# Patient Record
Sex: FEMALE | Race: BLACK OR AFRICAN AMERICAN | Employment: UNEMPLOYED | URBAN - METROPOLITAN AREA
[De-identification: names, ages, dates, MRNs, and addresses within clinical notes are randomized per-mention and may not be internally consistent; named-entity substitution may affect disease eponyms.]

---

## 2018-08-15 PROBLEM — R92.8 ABNORMAL ULTRASOUND OF BREAST: Status: ACTIVE | Noted: 2017-12-27

## 2018-09-12 PROBLEM — N30.10 INTERSTITIAL CYSTITIS: Status: ACTIVE | Noted: 2018-09-12

## 2018-09-18 ENCOUNTER — HOSPITAL ENCOUNTER (OUTPATIENT)
Dept: PHYSICAL THERAPY | Age: 38
Discharge: HOME | End: 2018-09-18
Attending: OBSTETRICS & GYNECOLOGY
Payer: COMMERCIAL

## 2018-09-18 DIAGNOSIS — R30.9 URINARY PAIN: ICD-10-CM

## 2018-09-18 PROCEDURE — 97110 THERAPEUTIC EXERCISES: CPT

## 2018-09-18 PROCEDURE — 97161 PT EVAL LOW COMPLEX 20 MIN: CPT

## 2018-09-18 NOTE — THERAPY EVALUATION
Byron Garcia  : 1980  [facility]  Secondary:  [address]  at [facility]  [address], [address], [place]  [phone]   [phone]        OUTPATIENT PHYSICAL THERAPY:Initial Assessment 2018    ICD-10: Treatment Diagnosis: R39.89 Bladder pain, R30.9 Urinary pain, R10.2 Pelvic and perineal pain, R27.8 Lack of coordination (muscle incoordination)  Precautions/Allergies:   Sulfa (sulfonamide antibiotics)   Fall Risk Score: 0 (? 5 = High Risk)  MD Orders: Evaluate and treat MEDICAL/REFERRING DIAGNOSIS:  Urinary pain [R30.9]   DATE OF ONSET: Chronic  REFERRING PHYSICIAN: Edward Deluna DO  RETURN PHYSICIAN APPOINTMENT: Not scheduled     INITIAL ASSESSMENT:  Ms. Paul presents sign and symptoms consistent with painful bladder and pelvic floor muscle incoordination. Due to late arrival to session, pelvic floor muscle examination was deferred until next appointment. She does demonstrate dysfunctional breathing patterns likely increasing PFM activity. Pt will continue to benefit from skilled physical therapy for further evaluation of muscular dysfunction in relation to urinary/bowel evacuation pain. PROBLEM LIST (Impacting functional limitations):  1. Increased Pain  2. Decreased [place] with Home Exercise Program INTERVENTIONS PLANNED:  1. Electrical Stimulation  2. Home Exercise Program (HEP)  3. Manual Therapy  4. Neuromuscular Re-education/Strengthening  5. Range of Motion (ROM)  6. Therapeutic Activites  7. Therapeutic Exercise/Strengthening   TREATMENT PLAN:  Effective Dates: 2018 TO 2018 (90 days). Frequency/Duration: 1 time a week for 90 Days  GOALS: (Goals have been discussed and agreed upon with patient.)  Short-Term Functional Goals: Time Frame: 6 weeks  Pt will demonstrate I with basic PFM HEP to improve awareness, coordination, and timing of PFM.   Pt with demonstrate normal voluntary relaxation of the pelvic floor muscle group to improve pelvic floor ROM. Pt will independently perform proper toilet position to improve bowel emptying and decrease pain with bowel movements. Discharge Goals: Time Frame: 12 weeks  1. Pt will verbalize understanding of role of diet on bladder health and pain symptoms. 2. Pt will demonstrate I with advanced PFM HEP, including dilator training, for improved long term management of symptoms. Rehabilitation Potential For Stated Goals: Good  Regarding Delmi Humphrey's therapy, I certify that the treatment plan above will be carried out by a therapist or under their direction. Thank you for this referral,  Paloma Pace, PT, DPT               The information in this section was collected on 9/18/2018 (except where otherwise noted). HISTORY:   History of Present Injury/Illness (Reason for Referral):  Pt reports having procedure at fertility clinic 2 years ago. After this she states that she began having pelvic and vaginal pain about 1 weeks later. This did improve. Last year she began experiencing similar pain (around the same time of the year). Most recent episode began on June 16, pain has improved \"about 90%\" since it began. States that she has \"spasms\" after urination or bowel movement that last about 10-15 minutes, in June this would have last over an hour. Has made some diet changes over the last week, including discontinuing her multivitamin which has helped. Urinary: Frequency every 2-3 hours, 0 x/night. Positive for stress urinary incontinence (DANK), urgency, incomplete emptying, dysuria- toward end of stream. Pt does not use pads for protects. Denies urge urinary incontinence (UUI), mixed urinary incontinence (INA), urgency, frequency, incomplete emptying, urinary hesitancy, dysuria, hematuria. Fluid intake: 48 oz water/day; bladder irritants include: milk/almond milk 16oz, hot tea 16oz- camomile, lemon, lemongrass  Bowel: Frequency 1x/day.  Positive for pain with bowel movement (BM). Denies pushing/straining with BM, incomplete emptying, fecal incontinence, constipation. Use of stool softeners or laxatives? No  Sexual: Denies pain with intercourse. Pelvic Organ Prolapse/Pelvic Pain: Location: urethral pain. Worse with urinary/bowel evacuation, food trigger and menstrual cycle. Relief with warm compress. Past Medical History/Comorbidities:   Ms. Paul  has a past medical history of seasonal allergies and Ovarian cyst.  Ms. Paul  has a past surgical history that includes hx wisdom teeth extraction. Social History/Living Environment:      Have you ever had any pelvic trauma (orthopedic in nature, fall, MVA, etc.)? Tailbone injury  Have you ever experienced any unwanted physical or sexual contact? No  Have you ever experienced any form of medical trauma (GYN, urological, GI, etc)? No    Prior Level of Function/Work/Activity:  Pt reports \"flares\" of similar type pain every year, around same time of year. Current Medications:       Current Outpatient Prescriptions:     Omega-3 Fatty Acids (FISH OIL) 500 mg cap, Take  by mouth., Disp: , Rfl:     methen-sod phos-meth blue-hyos (UROGESIC-BLUE) 81.6-40.8-0.12 mg tab, Take 1-3 tablets per day as needed, Disp: 60 Tab, Rfl: 2   Date Last Reviewed:  9/18/2018   Number of Personal Factors/Comorbidities that affect the Plan of Care: 0: LOW COMPLEXITY   EXAMINATION:   Observation/Orthostatic Postural Assessment:          Deferred to next appointment due to time restrictions. Palpation:          Deferred to next appointment due to time restrictions. ROM:          Deferred to next appointment due to time restrictions. Strength:          Deferred to next appointment due to time restrictions. Body Structures Involved:  1. Muscles Body Functions Affected:  1. Genitourinary  2. Neuromusculoskeletal  3. Movement Related Activities and Participation Affected:  1. Learning and Applying Knowledge  2.  General Tasks and Demands  3. Self Care   Number of elements (examined above) that affect the Plan of Care: 1-2: LOW COMPLEXITY   CLINICAL PRESENTATION:   Presentation: Stable and uncomplicated: LOW COMPLEXITY   CLINICAL DECISION MAKING:   Outcome Measure:   Pelvic Floor Impact Questionnaire (PFIQ-7)  Score (out of 300) Initial: 9/18/2018  Bladder/urinary: 19  Bowel/rectum: 0  Vagina/pelvis: 0  Total: 19 Most Recent:      Interpretation of Score: This survey asks questions concerning certain bowel, bladder, or pelvic symptoms and how much these symptoms interfere with daily activities. Each section is scored on a 0-3 scale, 3 representing the greatest disability. The scores of each section (out of 100) are added together for a total score out of 300. Score 0 1-59  120-179 180-239 240-299 300   Modifier CH CI CJ CK CL CM CN     Pain Disability Index (PDI)  Score (out of 70) Initial: 9/18/2018 2/70 Most Recent:      Interpretation of Score: This survey is a simple and rapid instrument for measuring the impact that pain has on the ability of a person to participate in essential life activities. There are 7 sections, each scored on a 0-10 scale, 10 representing the greatest disability. The scores of each section are added together for a total score out of 70. Score 0 1-13 14-27 28-41 42-55 56-69 70   Modifier CH CI CJ CK CL CM CN       Medical Necessity:   · Patient is expected to demonstrate progress in range of motion and coordination to decreased pain and improve function. · Patient demonstrates good rehab potential due to higher previous functional level. Reason for Services/Other Comments:  · Patient continues to require skilled intervention due to above mentioned deficits.    Use of outcome tool(s) and clinical judgement create a POC that gives a: Clear prediction of patient's progress: LOW COMPLEXITY            TREATMENT:   (In addition to Assessment/Re-Assessment sessions the following treatments were rendered)  Pre-treatment Symptoms/Complaints:  Urinary pain  Pain: Initial:   Pain Intensity 1: 0  Post Session:  0     THERAPEUTIC EXERCISE: (10 minutes):  Exercises per grid below to improve mobility and coordination. Required minimal verbal cues to promote proper body breathing techniques and PFM relaxation. Progressed resistance, range, repetitions and complexity of movement as indicated. Date:  9/18/2018 Date:   Date:     Activity/Exercise Parameters Parameters Parameters   PF drop 5 minutes     Diaphragmatic breathing 5 minutes             Treatment/Session Assessment:    · Response to Treatment:  Pt reports good understanding of plan of care, as well as prescribed home exercise program.  All questions were answered to pt's satisfaction. Pt was invited to call with any further questions or concerns. · Compliance with Program/Exercises: Will assess as treatment progresses. · Recommendations/Intent for next treatment session: \"Next visit will focus on PFM assessment and treatment as appropriate\".   Total Treatment Duration: Evaluation 20 minutes, treatment 10 minutes  PT Patient Time In/Time Out  Time In: 0903  Time Out: [address] Nix, PT, DPT

## 2018-09-18 NOTE — PROGRESS NOTES
Ambulatory/Rehab Services H2 Model Falls Risk Assessment    Risk Factor Pts. ·   Confusion/Disorientation/Impulsivity  []    4 ·   Symptomatic Depression  []   2 ·   Altered Elimination  []   1 ·   Dizziness/Vertigo  []   1 ·   Gender (Male)  []   1 ·   Any administered antiepileptics (anticonvulsants):  []   2 ·   Any administered benzodiazepines:  []   1 ·   Visual Impairment (specify):  []   1 ·   Portable Oxygen Use  []   1 ·   Orthostatic ? BP  []   1 ·   History of Recent Falls (within 3 mos.)  []   5     Ability to Rise from Chair (choose one) Pts. ·   Ability to rise in a single movement  [x]   0 ·   Pushes up, successful in one attempt  []   1 ·   Multiple attempts, but successful  []   3 ·   Unable to rise without assistance  []   4   Total: (5 or greater = High Risk) 0     Falls Prevention Plan:   []                Physical Limitations to Exercise (specify):   []                Mobility Assistance Device (type):   []                Exercise/Equipment Adaptation (specify):    ©2010 [facility] [address] Patent #[id number].  Federal Law prohibits the replication, distribution or use without written permission from [facility] [employer]

## 2018-09-26 ENCOUNTER — HOSPITAL ENCOUNTER (OUTPATIENT)
Dept: PHYSICAL THERAPY | Age: 38
Discharge: HOME | End: 2018-09-26
Attending: OBSTETRICS & GYNECOLOGY
Payer: COMMERCIAL

## 2018-09-26 PROCEDURE — 97140 MANUAL THERAPY 1/> REGIONS: CPT

## 2018-09-26 PROCEDURE — 97110 THERAPEUTIC EXERCISES: CPT

## 2018-09-26 NOTE — PROGRESS NOTES
Jaguar Denilson  : 1980  Primary: [facility]  Secondary:  [address]  at [facility]  [address], [address], [place] [address]  [phone]   [phone]        OUTPATIENT PHYSICAL THERAPY:Initial Assessment 2018    ICD-10: Treatment Diagnosis: R39.89 Bladder pain, R30.9 Urinary pain, R10.2 Pelvic and perineal pain, R27.8 Lack of coordination (muscle incoordination)  Precautions/Allergies:   Sulfa (sulfonamide antibiotics)   Fall Risk Score: 0 (? 5 = High Risk)  MD Orders: Evaluate and treat MEDICAL/REFERRING DIAGNOSIS:  Painful micturition, unspecified [R30.9]   DATE OF ONSET: Chronic  REFERRING PHYSICIAN: Kori Deluna DO  RETURN PHYSICIAN APPOINTMENT: Not scheduled     INITIAL ASSESSMENT:  Ms. Paul presents sign and symptoms consistent with painful bladder and pelvic floor muscle incoordination. Due to late arrival to session, pelvic floor muscle examination was deferred until next appointment. She does demonstrate dysfunctional breathing patterns likely increasing PFM activity. Pt will continue to benefit from skilled physical therapy for further evaluation of muscular dysfunction in relation to urinary/bowel evacuation pain. PROBLEM LIST (Impacting functional limitations):  1. Increased Pain  2. Decreased [place] with Home Exercise Program INTERVENTIONS PLANNED:  1. Electrical Stimulation  2. Home Exercise Program (HEP)  3. Manual Therapy  4. Neuromuscular Re-education/Strengthening  5. Range of Motion (ROM)  6. Therapeutic Activites  7. Therapeutic Exercise/Strengthening   TREATMENT PLAN:  Effective Dates: 2018 TO 2018 (90 days). Frequency/Duration: 1 time a week for 90 Days  GOALS: (Goals have been discussed and agreed upon with patient.)  Short-Term Functional Goals: Time Frame: 6 weeks  Pt will demonstrate I with basic PFM HEP to improve awareness, coordination, and timing of PFM.   Pt with demonstrate normal voluntary relaxation of the pelvic floor muscle group to improve pelvic floor ROM. Pt will independently perform proper toilet position to improve bowel emptying and decrease pain with bowel movements. Discharge Goals: Time Frame: 12 weeks  1. Pt will verbalize understanding of role of diet on bladder health and pain symptoms. 2. Pt will demonstrate I with advanced PFM HEP, including dilator training, for improved long term management of symptoms. Rehabilitation Potential For Stated Goals: Good  Regarding Delmi Humphrey's therapy, I certify that the treatment plan above will be carried out by a therapist or under their direction. Thank you for this referral,  Shawanda Zambrano, PT, DPT               The information in this section was collected on 9/18/2018 (except where otherwise noted). HISTORY:   History of Present Injury/Illness (Reason for Referral):  Pt reports having procedure at fertility clinic 2 years ago. After this she states that she began having pelvic and vaginal pain about 1 weeks later. This did improve. Last year she began experiencing similar pain (around the same time of the year). Most recent episode began on June 16, pain has improved \"about 90%\" since it began. States that she has \"spasms\" after urination or bowel movement that last about 10-15 minutes, in June this would have last over an hour. Has made some diet changes over the last week, including discontinuing her multivitamin which has helped. Urinary: Frequency every 2-3 hours, 0 x/night. Positive for stress urinary incontinence (DANK), urgency, incomplete emptying, dysuria- toward end of stream. Pt does not use pads for protects. Denies urge urinary incontinence (UUI), mixed urinary incontinence (INA), urgency, frequency, incomplete emptying, urinary hesitancy, dysuria, hematuria. Fluid intake: 48 oz water/day; bladder irritants include: milk/almond milk 16oz, hot tea 16oz- camomile, lemon, lemongrass  Bowel: Frequency 1x/day. Positive for pain with bowel movement (BM). Denies pushing/straining with BM, incomplete emptying, fecal incontinence, constipation. Use of stool softeners or laxatives? No  Sexual: Denies pain with intercourse. Pelvic Organ Prolapse/Pelvic Pain: Location: urethral pain. Worse with urinary/bowel evacuation, food trigger and menstrual cycle. Relief with warm compress. Past Medical History/Comorbidities:   Ms. Delia Harada  has a past medical history of seasonal allergies and Ovarian cyst.  Ms. Delia Harada  has a past surgical history that includes hx wisdom teeth extraction. Social History/Living Environment:      Have you ever had any pelvic trauma (orthopedic in nature, fall, MVA, etc.)? Tailbone injury  Have you ever experienced any unwanted physical or sexual contact? No  Have you ever experienced any form of medical trauma (GYN, urological, GI, etc)? No    Prior Level of Function/Work/Activity:  Pt reports \"flares\" of similar type pain every year, around same time of year. Current Medications:       Current Outpatient Prescriptions:     Omega-3 Fatty Acids (FISH OIL) 500 mg cap, Take  by mouth., Disp: , Rfl:     methen-sod phos-meth blue-hyos (UROGESIC-BLUE) 81.6-40.8-0.12 mg tab, Take 1-3 tablets per day as needed, Disp: 60 Tab, Rfl: 2   Date Last Reviewed:  9/26/2018   EXAMINATION:   Observation/Orthostatic Postural Assessment:          Muscle guarding and tension throughout abdominals. Palpation:          Pt reports \"soreness\" at B STP and at superficial PFM at midline. Ischiocavernosus and compressor urethra (B) are painful and mildly relieved with S/CS. Iliococcygeus B is \"umcomfortable\"  ROM:          Limited 2/2 overactivity  Strength:          Not tested today 2/2 pain. Body Structures Involved:  1. Muscles Body Functions Affected:  1. Genitourinary  2. Neuromusculoskeletal  3. Movement Related Activities and Participation Affected:  1. Learning and Applying Knowledge  2.  General Tasks and Demands  3. Self Care   CLINICAL PRESENTATION:   CLINICAL DECISION MAKING:   Outcome Measure:   Pelvic Floor Impact Questionnaire (PFIQ-7)  Score (out of 300) Initial: 9/18/2018  Bladder/urinary: 19  Bowel/rectum: 0  Vagina/pelvis: 0  Total: 19 Most Recent:      Interpretation of Score: This survey asks questions concerning certain bowel, bladder, or pelvic symptoms and how much these symptoms interfere with daily activities. Each section is scored on a 0-3 scale, 3 representing the greatest disability. The scores of each section (out of 100) are added together for a total score out of 300. Score 0 1-59  120-179 180-239 240-299 300   Modifier CH CI CJ CK CL CM CN     Pain Disability Index (PDI)  Score (out of 70) Initial: 9/18/2018 2/70 Most Recent:      Interpretation of Score: This survey is a simple and rapid instrument for measuring the impact that pain has on the ability of a person to participate in essential life activities. There are 7 sections, each scored on a 0-10 scale, 10 representing the greatest disability. The scores of each section are added together for a total score out of 70. Score 0 1-13 14-27 28-41 42-55 56-69 70   Modifier CH CI CJ CK CL CM CN       Medical Necessity:   · Patient is expected to demonstrate progress in range of motion and coordination to decreased pain and improve function. · Patient demonstrates good rehab potential due to higher previous functional level. Reason for Services/Other Comments:  · Patient continues to require skilled intervention due to above mentioned deficits. TREATMENT:   (In addition to Assessment/Re-Assessment sessions the following treatments were rendered)  Pre-treatment Symptoms/Complaints:  Pt states that she really liked the breathing and felt that it helped to relax her.  She states that the drops made her feel sore in the pelvic floor muscles, noting that it might be because she was also on her menstrual cycle, \"so maybe it was just too much that week. \"  Pain: Initial:   Pain Intensity 1: 0  Post Session:  0     THERAPEUTIC EXERCISE: (25 minutes):  Exercises per grid below to improve mobility and coordination. Required minimal verbal cues to promote proper body breathing techniques and PFM relaxation. Progressed resistance, range, repetitions and complexity of movement as indicated. MANUAL THERAPY: (30 minutes): Soft tissue mobilization was utilized and necessary because of the patient's painful spasm and restricted motion of soft tissue. Date:  9/18/2018 Date:  9/26/2018 Date:     Activity/Exercise Parameters Parameters Parameters   PF drop 5 minutes     Diaphragmatic breathing 5 minutes 5 minutes    Single knee to chest stretch  3x30s    Cat/cow  x10    Child's pose  2 minutes    Lumbar rotation  2 minutes    Patient education  Relation of mm tightness to PFM overactivity            Date Type Location Comments   9/26/2018 Internal assessment/treatment Via vaginal canal Single digit manual to all layer, SP, S/CS with some improvements    STM abdomen Bladder mobilization, skin rolling abdomen                                   (Used abbreviations: MET - muscle energy technique; SCS- Strain counter strain; CTM-Connective tissue mobilizations; CR- Contract/relax; SP- Sustained pressure, TrP-Trigger point release, IASTM- Instrument assisted soft tissue mobilizations, TDN-Trigger point dry needling)    Treatment/Session Assessment:    · Response to Treatment:  Pt gives verbal consent to internal vaginal assessment/treatment with chaperon present Abeba Petersen PT). Pt has increased tension and tenderness throughout all PFM layers. She has mild improvements with S/CS techniques and requires mindfulness and attention to breathing to decreased tone. She will continue to benefit from skilled physical therapy with a focus on downtraining and PFM relaxation for improved pain management.   · Compliance with Program/Exercises: Compliant with HEP.  · Recommendations/Intent for next treatment session: \"Next visit will focus on continue with manual therapy as tolerate, biofeedback, downtraining. \".   Total Treatment Duration: 55 minutes  PT Patient Time In/Time Out  Time In: 0930  Time Out: [address] Nix, PT, DPT

## 2018-10-02 ENCOUNTER — HOSPITAL ENCOUNTER (OUTPATIENT)
Dept: PHYSICAL THERAPY | Age: 38
Discharge: HOME | End: 2018-10-02
Attending: OBSTETRICS & GYNECOLOGY
Payer: COMMERCIAL

## 2018-10-02 PROCEDURE — 97140 MANUAL THERAPY 1/> REGIONS: CPT

## 2018-10-02 PROCEDURE — 97110 THERAPEUTIC EXERCISES: CPT

## 2018-10-02 PROCEDURE — 97530 THERAPEUTIC ACTIVITIES: CPT

## 2018-10-02 NOTE — PROGRESS NOTES
Abril Rutledge : 1980 Primary: [address] Secondary:  Therapy Center at [facility] [address], [address], [place] [address] [phone]   [phone] OUTPATIENT PHYSICAL THERAPY:Discharge 10/2/2018 ICD-10: Treatment Diagnosis: R39.89 Bladder pain, R30.9 Urinary pain, R10.2 Pelvic and perineal pain, R27.8 Lack of coordination (muscle incoordination) Precautions/Allergies:  
Sulfa (sulfonamide antibiotics) Fall Risk Score: 0 (? 5 = High Risk) MD Orders: Evaluate and treat MEDICAL/REFERRING DIAGNOSIS: 
Painful micturition, unspecified [R30.9] DATE OF ONSET: Chronic REFERRING PHYSICIAN: Stephanie Rosen DO 
RETURN PHYSICIAN APPOINTMENT: Not scheduled INITIAL ASSESSMENT:  Ms. Paul has been seen for 2 sessions. Patient responded well to therapy, with improvements in understanding of muscular role in relation to symptoms, bladder health and I performance of HEP. She has made good progress in regards to decreased pain intensity and frequency and requests to discharge to home exercise program independence. TREATMENT PLAN: 
Effective Dates: 2018 TO 2018 (90 days). Frequency/Duration: 1 time a week for 90 Days GOALS: (Goals have been discussed and agreed upon with patient.) Short-Term Functional Goals: Time Frame: 6 weeks Pt will demonstrate I with basic PFM HEP to improve awareness, coordination, and timing of PFM. (MET 10/2/2018) Pt with demonstrate normal voluntary relaxation of the pelvic floor muscle group to improve pelvic floor ROM. Pt will independently perform proper toilet position to improve bowel emptying and decrease pain with bowel movements. Discharge Goals: Time Frame: 12 weeks 1. Pt will verbalize understanding of role of diet on bladder health and pain symptoms. (MET 10/2/2018) 2.  Pt will demonstrate I with advanced PFM HEP, including dilator training, for improved long term management of symptoms. (ONGOING 10/2/2018) Rehabilitation Potential For Stated Goals: Good The information in this section was collected on 9/18/2018 (except where otherwise noted). HISTORY:  
History of Present Injury/Illness (Reason for Referral): 
Pt reports having procedure at fertility clinic 2 years ago. After this she states that she began having pelvic and vaginal pain about 1 weeks later. This did improve. Last year she began experiencing similar pain (around the same time of the year). Most recent episode began on June 16, pain has improved \"about 90%\" since it began. States that she has \"spasms\" after urination or bowel movement that last about 10-15 minutes, in June this would have last over an hour. Has made some diet changes over the last week, including discontinuing her multivitamin which has helped. Urinary: Frequency every 2-3 hours, 0 x/night. Positive for stress urinary incontinence (DANK), urgency, incomplete emptying, dysuria- toward end of stream. Pt does not use pads for protects. Denies urge urinary incontinence (UUI), mixed urinary incontinence (INA), urgency, frequency, incomplete emptying, urinary hesitancy, dysuria, hematuria. Fluid intake: 48 oz water/day; bladder irritants include: milk/almond milk 16oz, hot tea 16oz- camomile, lemon, lemongrass Bowel: Frequency 1x/day. Positive for pain with bowel movement (BM). Denies pushing/straining with BM, incomplete emptying, fecal incontinence, constipation. Use of stool softeners or laxatives? No 
Sexual: Denies pain with intercourse. Pelvic Organ Prolapse/Pelvic Pain: Location: urethral pain. Worse with urinary/bowel evacuation, food trigger and menstrual cycle. Relief with warm compress. Past Medical History/Comorbidities: Ms. Paul  has a past medical history of seasonal allergies and Ovarian cyst.  Ms. Paul  has a past surgical history that includes hx wisdom teeth extraction. Social History/Living Environment:  
  
Have you ever had any pelvic trauma (orthopedic in nature, fall, MVA, etc.)? Tailbone injury Have you ever experienced any unwanted physical or sexual contact? No 
Have you ever experienced any form of medical trauma (GYN, urological, GI, etc)? No 
 
Prior Level of Function/Work/Activity: 
Pt reports \"flares\" of similar type pain every year, around same time of year. Current Medications:   
  
Current Outpatient Prescriptions:   Omega-3 Fatty Acids (FISH OIL) 500 mg cap, Take  by mouth., Disp: , Rfl:  
  methen-sod phos-meth blue-hyos (UROGESIC-BLUE) 81.6-40.8-0.12 mg tab, Take 1-3 tablets per day as needed, Disp: 60 Tab, Rfl: 2 Date Last Reviewed:  10/2/2018 EXAMINATION:  
Observation/Orthostatic Postural Assessment:   
      Muscle guarding and tension throughout abdominals. Palpation:   
      Pt reports \"soreness\" at B STP and at superficial PFM at midline. Ischiocavernosus and compressor urethra (B) are painful and mildly relieved with S/CS. Iliococcygeus B is \"umcomfortable\" ROM:   
      Limited 2/2 overactivity Strength:   
      Not tested today 2/2 pain. CLINICAL PRESENTATION:  
CLINICAL DECISION MAKING:  
Outcome Measure:  
Pelvic Floor Impact Questionnaire (PFIQ-7) Score (out of 300) Initial: 9/18/2018 Bladder/urinary: 23 Bowel/rectum: 0 Vagina/pelvis: 0 Total: 19 Most Recent:   
 
Interpretation of Score: This survey asks questions concerning certain bowel, bladder, or pelvic symptoms and how much these symptoms interfere with daily activities. Each section is scored on a 0-3 scale, 3 representing the greatest disability. The scores of each section (out of 100) are added together for a total score out of 300. Score 0 1-59  120-179 180-239 240-299 300 Modifier CH CI CJ CK CL CM CN Pain Disability Index ([facility]) Score (out of 70) Initial: 9/18/2018 2/70 Most Recent: Interpretation of Score: This survey is a simple and rapid instrument for measuring the impact that pain has on the ability of a person to participate in essential life activities. There are 7 sections, each scored on a 0-10 scale, 10 representing the greatest disability. The scores of each section are added together for a total score out of 70. Score 0 1-13 14-27 28-41 42-55 56-69 70 Modifier CH CI CJ CK CL CM CN  
  
  
 
 
 
TREATMENT:  
(In addition to Assessment/Re-Assessment sessions the following treatments were rendered) Pre-treatment Symptoms/Complaints:  Pt reports over decreased intensity and frequency of pain. Reports most irritation if she does not keep fluids up Pain: Initial:  
Pain Intensity 1: 0  Post Session:  0  
 
THERAPEUTIC ACTIVITY: ( 10 minutes): Therapeutic activities per grid below to improve mobility. Required moderate verbal and tactile cues to promote understanding of use and performance of dilator training. THERAPEUTIC EXERCISE: (10 minutes):  Exercises per grid below to improve mobility and coordination. Required minimal verbal cues to promote proper body breathing techniques and PFM relaxation. Progressed resistance, range, repetitions and complexity of movement as indicated. MANUAL THERAPY: (35 minutes): Soft tissue mobilization was utilized and necessary because of the patient's painful spasm and restricted motion of soft tissue. Date: 
9/18/2018 Date: 
9/26/2018 Date: 
10/2/2018 Activity/Exercise Parameters Parameters Parameters PF drop 5 minutes Diaphragmatic breathing 5 minutes 5 minutes Single knee to chest stretch  3x30s Cat/cow  x10 Child's pose  2 minutes Lumbar rotation  2 minutes Patient education  Relation of mm tightness to PFM overactivity Review of flexibility program- add happy baby- 10 minutes Dilator training   Instruction for use and positioning with dilators, dilator selection (therapeutic activity) 10 minutes Date Type Location Comments 10/2/2018 Internal assessment/treatment Via vaginal canal 1st amielle dilator to all layer, SP, S/CS with some improvements STM abdomen Bladder mobilization, skin rolling abdomen STM adductors Skin rolling  
     
     
     
     
 
(Used abbreviations: MET - muscle energy technique; SCS- Strain counter strain; CTM-Connective tissue mobilizations; CR- Contract/relax; SP- Sustained pressure, TrP-Trigger point release, IASTM- Instrument assisted soft tissue mobilizations, TDN-Trigger point dry needling) Treatment/Session Assessment:   
· Response to Treatment: Pt is making good progress with overall decreased pain and improved mm control and relaxation. She was instructed in use of dilators for I performance for improved PFM relaxation, as well as additional flexibility for daily use and after exercise. She would like to be discharged to [facility] at this time. She was invited to call with any questions or concerns. · Compliance with Program/Exercises: Compliant with [facility]. Total Treatment Duration: 55 minutes PT Patient Time In/Time Out Time In: 0830 Time Out: 7133 Maxine Vila, PT, DPT

## 2018-10-02 NOTE — THERAPY DISCHARGE
Karon Ren  : 1980  Primary: [employer] [employer] [facility]  Secondary:  [address] at [address]  [address], [address], [place] [address]  [phone]   [phone]        OUTPATIENT PHYSICAL THERAPY:Discharge 10/2/2018    ICD-10: Treatment Diagnosis: R39.89 Bladder pain, R30.9 Urinary pain, R10.2 Pelvic and perineal pain, R27.8 Lack of coordination (muscle incoordination)  Precautions/Allergies:   Sulfa (sulfonamide antibiotics)   Fall Risk Score: 0 (? 5 = High Risk)  MD Orders: Evaluate and treat MEDICAL/REFERRING DIAGNOSIS:  Painful micturition, unspecified [R30.9]   DATE OF ONSET: Chronic  REFERRING PHYSICIAN: Nic Mayberry DO  RETURN PHYSICIAN APPOINTMENT: Not scheduled     INITIAL ASSESSMENT:  Ms. Paul has been seen for 2 sessions. Patient responded well to therapy, with improvements in understanding of muscular role in relation to symptoms, bladder health and I performance of HEP. She has made good progress in regards to decreased pain intensity and frequency and requests to discharge to home exercise program independence. TREATMENT PLAN:  Effective Dates: 2018 TO 2018 (90 days). Frequency/Duration: 1 time a week for 90 Days  GOALS: (Goals have been discussed and agreed upon with patient.)  Short-Term Functional Goals: Time Frame: 6 weeks  Pt will demonstrate I with basic PFM HEP to improve awareness, coordination, and timing of PFM. (MET 10/2/2018)  Pt with demonstrate normal voluntary relaxation of the pelvic floor muscle group to improve pelvic floor ROM. Pt will independently perform proper toilet position to improve bowel emptying and decrease pain with bowel movements. Discharge Goals: Time Frame: 12 weeks  1. Pt will verbalize understanding of role of diet on bladder health and pain symptoms. (MET 10/2/2018)  2. Pt will demonstrate I with advanced PFM HEP, including dilator training, for improved long term management of symptoms. (ONGOING 10/2/2018)  Rehabilitation Potential For Stated Goals: Good             The information in this section was collected on 9/18/2018 (except where otherwise noted). HISTORY:   History of Present Injury/Illness (Reason for Referral):  Pt reports having procedure at fertility clinic 2 years ago. After this she states that she began having pelvic and vaginal pain about 1 weeks later. This did improve. Last year she began experiencing similar pain (around the same time of the year). Most recent episode began on June 16, pain has improved \"about 90%\" since it began. States that she has \"spasms\" after urination or bowel movement that last about 10-15 minutes, in June this would have last over an hour. Has made some diet changes over the last week, including discontinuing her multivitamin which has helped. Urinary: Frequency every 2-3 hours, 0 x/night. Positive for stress urinary incontinence (DANK), urgency, incomplete emptying, dysuria- toward end of stream. Pt does not use pads for protects. Denies urge urinary incontinence (UUI), mixed urinary incontinence (INA), urgency, frequency, incomplete emptying, urinary hesitancy, dysuria, hematuria. Fluid intake: 48 oz water/day; bladder irritants include: milk/almond milk 16oz, hot tea 16oz- camomile, lemon, lemongrass  Bowel: Frequency 1x/day. Positive for pain with bowel movement (BM). Denies pushing/straining with BM, incomplete emptying, fecal incontinence, constipation. Use of stool softeners or laxatives? No  Sexual: Denies pain with intercourse. Pelvic Organ Prolapse/Pelvic Pain: Location: urethral pain. Worse with urinary/bowel evacuation, food trigger and menstrual cycle. Relief with warm compress. Past Medical History/Comorbidities:   Ms. Bridgett Manuel  has a past medical history of seasonal allergies and Ovarian cyst.  Ms. Bridgett Manuel  has a past surgical history that includes hx wisdom teeth extraction.   Social History/Living Environment: Have you ever had any pelvic trauma (orthopedic in nature, fall, MVA, etc.)? Tailbone injury  Have you ever experienced any unwanted physical or sexual contact? No  Have you ever experienced any form of medical trauma (GYN, urological, GI, etc)? No    Prior Level of Function/Work/Activity:  Pt reports \"flares\" of similar type pain every year, around same time of year. Current Medications:       Current Outpatient Prescriptions:     Omega-3 Fatty Acids (FISH OIL) 500 mg cap, Take  by mouth., Disp: , Rfl:     methen-sod phos-meth blue-hyos (UROGESIC-BLUE) 81.6-40.8-0.12 mg tab, Take 1-3 tablets per day as needed, Disp: 60 Tab, Rfl: 2   Date Last Reviewed:  10/2/2018   EXAMINATION:   Observation/Orthostatic Postural Assessment:          Muscle guarding and tension throughout abdominals. Palpation:          Pt reports \"soreness\" at B STP and at superficial PFM at midline. Ischiocavernosus and compressor urethra (B) are painful and mildly relieved with S/CS. Iliococcygeus B is \"umcomfortable\"  ROM:          Limited 2/2 overactivity  Strength:          Not tested today 2/2 pain. CLINICAL PRESENTATION:   CLINICAL DECISION MAKING:   Outcome Measure:   Pelvic Floor Impact Questionnaire (PFIQ-7)  Score (out of 300) Initial: 9/18/2018  Bladder/urinary: 19  Bowel/rectum: 0  Vagina/pelvis: 0  Total: 19 Most Recent:      Interpretation of Score: This survey asks questions concerning certain bowel, bladder, or pelvic symptoms and how much these symptoms interfere with daily activities. Each section is scored on a 0-3 scale, 3 representing the greatest disability. The scores of each section (out of 100) are added together for a total score out of 300. Score 0 1-59  120-179 180-239 240-299 300   Modifier CH CI CJ CK CL CM CN     Pain Disability Index (PDI)  Score (out of 70) Initial: 9/18/2018 2/70 Most Recent:      Interpretation of Score:  This survey is a simple and rapid instrument for measuring the impact that pain has on the ability of a person to participate in essential life activities. There are 7 sections, each scored on a 0-10 scale, 10 representing the greatest disability. The scores of each section are added together for a total score out of 70. Score 0 1-13 14-27 28-41 42-55 56-69 70   Modifier CH CI CJ CK CL CM CN               TREATMENT:   (In addition to Assessment/Re-Assessment sessions the following treatments were rendered)  Pre-treatment Symptoms/Complaints:  Pt reports over decreased intensity and frequency of pain. Reports most irritation if she does not keep fluids up  Pain: Initial:   Pain Intensity 1: 0  Post Session:  0     THERAPEUTIC ACTIVITY: ( 10 minutes): Therapeutic activities per grid below to improve mobility. Required moderate verbal and tactile cues to promote understanding of use and performance of dilator training. THERAPEUTIC EXERCISE: (10 minutes):  Exercises per grid below to improve mobility and coordination. Required minimal verbal cues to promote proper body breathing techniques and PFM relaxation. Progressed resistance, range, repetitions and complexity of movement as indicated. MANUAL THERAPY: (35 minutes): Soft tissue mobilization was utilized and necessary because of the patient's painful spasm and restricted motion of soft tissue.     Date:  9/18/2018 Date:  9/26/2018 Date:  10/2/2018   Activity/Exercise Parameters Parameters Parameters   PF drop 5 minutes     Diaphragmatic breathing 5 minutes 5 minutes    Single knee to chest stretch  3x30s    Cat/cow  x10    Child's pose  2 minutes    Lumbar rotation  2 minutes    Patient education  Relation of mm tightness to PFM overactivity Review of flexibility program- add happy baby- 10 minutes   Dilator training   Instruction for use and positioning with dilators, dilator selection (therapeutic activity) 10 minutes     Date Type Location Comments   10/2/2018 Internal assessment/treatment Via vaginal canal 1st mosqueda dilator to all layer, SP, S/CS with some improvements    STM abdomen Bladder mobilization, skin rolling abdomen    STM adductors Skin rolling                             (Used abbreviations: MET - muscle energy technique; SCS- Strain counter strain; CTM-Connective tissue mobilizations; CR- Contract/relax; SP- Sustained pressure, TrP-Trigger point release, IASTM- Instrument assisted soft tissue mobilizations, TDN-Trigger point dry needling)    Treatment/Session Assessment:    · Response to Treatment: Pt is making good progress with overall decreased pain and improved mm control and relaxation. She was instructed in use of dilators for I performance for improved PFM relaxation, as well as additional flexibility for daily use and after exercise. She would like to be discharged to [facility] at this time. She was invited to call with any questions or concerns. · Compliance with Program/Exercises: Compliant with [facility].   Total Treatment Duration: 55 minutes  PT Patient Time In/Time Out  Time In: 0830  Time Out: 3014 Pan Izaguirre, PT, DPT

## 2022-03-18 PROBLEM — R92.8 ABNORMAL ULTRASOUND OF BREAST: Status: ACTIVE | Noted: 2017-12-27

## 2022-03-19 PROBLEM — N30.10 INTERSTITIAL CYSTITIS: Status: ACTIVE | Noted: 2018-09-12

## 2023-01-24 ENCOUNTER — OFFICE VISIT (OUTPATIENT)
Dept: OBGYN CLINIC | Age: 43
End: 2023-01-24
Payer: COMMERCIAL

## 2023-01-24 VITALS
SYSTOLIC BLOOD PRESSURE: 110 MMHG | BODY MASS INDEX: 21.94 KG/M2 | HEIGHT: 62 IN | DIASTOLIC BLOOD PRESSURE: 78 MMHG | WEIGHT: 119.2 LBS

## 2023-01-24 DIAGNOSIS — Z87.59 ULTRASOUND SCAN TO CONFIRM FETAL VIABILITY WITH HISTORY OF MISCARRIAGE: Primary | ICD-10-CM

## 2023-01-24 DIAGNOSIS — Z32.01 POSITIVE PREGNANCY TEST: ICD-10-CM

## 2023-01-24 DIAGNOSIS — O36.80X0 ULTRASOUND SCAN TO CONFIRM FETAL VIABILITY WITH HISTORY OF MISCARRIAGE: Primary | ICD-10-CM

## 2023-01-24 LAB
ABO + RH BLD: NORMAL
HCG, PREGNANCY, URINE, POC: POSITIVE
VALID INTERNAL CONTROL, POC: YES

## 2023-01-24 PROCEDURE — 76817 TRANSVAGINAL US OBSTETRIC: CPT | Performed by: OBSTETRICS & GYNECOLOGY

## 2023-01-24 PROCEDURE — 99204 OFFICE O/P NEW MOD 45 MIN: CPT | Performed by: OBSTETRICS & GYNECOLOGY

## 2023-01-24 PROCEDURE — 81025 URINE PREGNANCY TEST: CPT | Performed by: OBSTETRICS & GYNECOLOGY

## 2023-01-24 NOTE — PROGRESS NOTES
The patient is a 40 y.o. No obstetric history on file. who is seen for early OB ultrasound. Pt has a history of blocked fallopian tube on HSG but is unsure of which tube. She had a positive pregnancy test and should be 5w3d based on LMP of 12/17/22. Urine pregnancy test is positive in office today. She states that she is not having any pelvic pain/bleeding today. She did have intermittent pain over the weekend that was in middle of pelvis. She has seen [facility] in [facility] and was given progesterone cream. Was using this cycle days 14-28 prior to pregnancy and then began using this daily when she had a positive pregnancy test.     US Findings:  No evidence of IUP seen. Endo=1.6 cm  ROV visualized with follicles and appears wnl  LOV visualized with follicles. There is a complex cystic mass adjacent to the LT ovary measuring 1.9 x 1.8 x 1.8 cm. This area could possible be a hemorrhagic cyst although ectopic pregnancy cannot be ruled out  Bilateral ADN appear wnl    HISTORY:    No obstetric history on file. Patient's last menstrual period was 12/17/2022. Sexual History:  single partner, contraception - none  Contraception:  none  Current Outpatient Medications on File Prior to Visit   Medication Sig Dispense Refill    UNABLE TO FIND Progesterone cream daily. 30 mg/mL 2 clicks      Prenatal Vit-Fe Fumarate-FA (PRENATAL PO) Take by mouth      UNABLE TO FIND Feroglobin iron supplement       No current facility-administered medications on file prior to visit. ROS:  Feeling well. No dyspnea or chest pain on exertion. No abdominal pain, change in bowel habits, black or bloody stools. No urinary tract symptoms. GYN ROS: she complains of early pregnancy test is pos. PHYSICAL EXAM:  Blood pressure 110/78, height 5' 2\" (1.575 m), last menstrual period 12/17/2022. The patient appears well, alert, oriented x 3, in no distress. ASSESSMENT:    1.  Ultrasound scan to confirm fetal viability with history of miscarriage  -     AMB POC US OB TRANSVAGINAL  2. Positive pregnancy test  -     AMB POC URINE PREGNANCY TEST, VISUAL COLOR COMPARISON     PLAN:  All questions answered. Reviewed HSG at [facility] July 2017. Shows likely occlusion of proximal left tube. Since there is a 1.8 cm complex cyst on left ovary, cannot exclude ectopic but too soon to diagnose. Ectopic precautions.   Blood tests: Quantitative hCG with progesterone and blood type today; repeat HCG in 2 days  Diagnosis explained in detail, including differential  Follow up in 2 days, cont PNVs

## 2023-01-25 LAB
HCG SERPL-ACNC: 672 MIU/ML (ref 0–6)
PROGEST SERPL-MCNC: 7.01 NG/ML

## 2023-01-26 ENCOUNTER — NURSE ONLY (OUTPATIENT)
Dept: OBGYN CLINIC | Age: 43
End: 2023-01-26

## 2023-01-26 DIAGNOSIS — Z87.59 ULTRASOUND SCAN TO CONFIRM FETAL VIABILITY WITH HISTORY OF MISCARRIAGE: Primary | ICD-10-CM

## 2023-01-26 DIAGNOSIS — O36.80X0 ULTRASOUND SCAN TO CONFIRM FETAL VIABILITY WITH HISTORY OF MISCARRIAGE: Primary | ICD-10-CM

## 2023-01-26 LAB — HCG SERPL-ACNC: 693 MIU/ML (ref 0–6)

## 2023-01-26 NOTE — PROGRESS NOTES
Orders Placed This Encounter    HCG, Quantitative, Pregnancy     Standing Status:   Future     Number of Occurrences:   1     Standing Expiration Date:   1/26/2024

## 2023-01-27 ENCOUNTER — TELEPHONE (OUTPATIENT)
Dept: ONCOLOGY | Age: 43
End: 2023-01-27

## 2023-01-27 ENCOUNTER — TELEPHONE (OUTPATIENT)
Dept: OBGYN CLINIC | Age: 43
End: 2023-01-27

## 2023-01-27 ENCOUNTER — HOSPITAL ENCOUNTER (OUTPATIENT)
Dept: INFUSION THERAPY | Age: 43
Discharge: HOME | End: 2023-01-27
Payer: COMMERCIAL

## 2023-01-27 ENCOUNTER — HOSPITAL ENCOUNTER (OUTPATIENT)
Dept: LAB | Age: 43
Discharge: HOME | End: 2023-01-30

## 2023-01-27 VITALS
TEMPERATURE: 97.9 F | HEIGHT: 62 IN | RESPIRATION RATE: 16 BRPM | DIASTOLIC BLOOD PRESSURE: 85 MMHG | HEART RATE: 78 BPM | SYSTOLIC BLOOD PRESSURE: 130 MMHG | WEIGHT: 121 LBS | BODY MASS INDEX: 22.26 KG/M2 | OXYGEN SATURATION: 100 %

## 2023-01-27 LAB
AST SERPL-CCNC: 18 U/L (ref 15–37)
BASOPHILS # BLD: 0.1 K/UL (ref 0–0.2)
BASOPHILS NFR BLD: 1 % (ref 0–2)
CREAT SERPL-MCNC: 1.1 MG/DL (ref 0.6–1)
DIFFERENTIAL METHOD BLD: NORMAL
EOSINOPHIL # BLD: 0.3 K/UL (ref 0–0.8)
EOSINOPHIL NFR BLD: 4 % (ref 0.5–7.8)
ERYTHROCYTE [DISTWIDTH] IN BLOOD BY AUTOMATED COUNT: 12.5 % (ref 11.9–14.6)
HCG SERPL-ACNC: 859 MIU/ML (ref 0–6)
HCT VFR BLD AUTO: 38.9 % (ref 35.8–46.3)
HGB BLD-MCNC: 12.2 G/DL (ref 11.7–15.4)
IMM GRANULOCYTES # BLD AUTO: 0 K/UL (ref 0–0.5)
IMM GRANULOCYTES NFR BLD AUTO: 0 % (ref 0–5)
LYMPHOCYTES # BLD: 2.9 K/UL (ref 0.5–4.6)
LYMPHOCYTES NFR BLD: 37 % (ref 13–44)
MCH RBC QN AUTO: 27.2 PG (ref 26.1–32.9)
MCHC RBC AUTO-ENTMCNC: 31.4 G/DL (ref 31.4–35)
MCV RBC AUTO: 86.6 FL (ref 82–102)
MONOCYTES # BLD: 0.6 K/UL (ref 0.1–1.3)
MONOCYTES NFR BLD: 8 % (ref 4–12)
NEUTS SEG # BLD: 3.9 K/UL (ref 1.7–8.2)
NEUTS SEG NFR BLD: 51 % (ref 43–78)
NRBC # BLD: 0 K/UL (ref 0–0.2)
PLATELET # BLD AUTO: 215 K/UL (ref 150–450)
PMV BLD AUTO: 10.4 FL (ref 9.4–12.3)
RBC # BLD AUTO: 4.49 M/UL (ref 4.05–5.2)
WBC # BLD AUTO: 7.8 K/UL (ref 4.3–11.1)

## 2023-01-27 PROCEDURE — 96401 CHEMO ANTI-NEOPL SQ/IM: CPT

## 2023-01-27 PROCEDURE — 82565 ASSAY OF CREATININE: CPT

## 2023-01-27 PROCEDURE — 84702 CHORIONIC GONADOTROPIN TEST: CPT

## 2023-01-27 PROCEDURE — 84450 TRANSFERASE (AST) (SGOT): CPT

## 2023-01-27 PROCEDURE — 85025 COMPLETE CBC W/AUTO DIFF WBC: CPT

## 2023-01-27 PROCEDURE — 36415 COLL VENOUS BLD VENIPUNCTURE: CPT

## 2023-01-27 PROCEDURE — 6360000002 HC RX W HCPCS: Performed by: OBSTETRICS & GYNECOLOGY

## 2023-01-27 RX ORDER — METHOTREXATE 25 MG/ML
77.5 INJECTION INTRA-ARTERIAL; INTRAMUSCULAR; INTRATHECAL; INTRAVENOUS ONCE
Status: COMPLETED | OUTPATIENT
Start: 2023-01-27 | End: 2023-01-27

## 2023-01-27 RX ADMIN — METHOTREXATE 77.5 MG: 25 SOLUTION INTRA-ARTERIAL; INTRAMUSCULAR; INTRATHECAL; INTRAVENOUS at 16:42

## 2023-01-27 NOTE — TELEPHONE ENCOUNTER
Spoke with pt, she states that she is having some discomfort in her LLQ, however, she is unsure if this is related to exercise that she did on Wednesday. She has continued to experience discomfort yesterday and today. She denies any vaginal bleeding or spotting. She has a history of blocked left fallopian tube. She had an ultrasound on 1/24/23 showing no evidence of IUP. She had quant drawn on 1/24/23 that was 672 and repeat quant on 1/26/23 that was 693. Blood type is O+. She is advised that I will speak with Dr. Wandy Chun who is in the office today regarding recommendations and give her a call back. Spoke with Dr. Wandy Chun in office who advises pt should be scheduled for methotrexate infusion today. Spoke with infusion center and left detailed message for them to contact the office regarding patient. Patient is aware we are waiting to hear back regarding time for infusion today. She is scheduled for follow up quant 4 days post infusion on 1/31/23 and office visit on 2/3/23 7 days post infusion. Dr. Voss Servant on call for [employer], he is made aware. Patient is aware that I will contact her with appointment time when I hear back from infusion center. All questions answered.

## 2023-01-27 NOTE — TELEPHONE ENCOUNTER
Received a call from [facility] AT [facility]  with [address] [place] @[phone] & they request a call back regarding an order for  Methotrexate Infusion for this Pt. I gave them the fax#[phone] as well.

## 2023-01-27 NOTE — TELEPHONE ENCOUNTER
She saw her results on MyChart and has some questions, does she need an appointment, and Dr. Eliza Valencia had mentioned maybe some medication.

## 2023-01-27 NOTE — TELEPHONE ENCOUNTER
Called pt to advise her that I have not heard back from the infusion center at this time, advised her to head over to the cancer center. Pt is advised that she may have to wait upon arrival. She states that she has tried to call them as well. She voiced full understanding and is aware. All questions answered and pt advised to call back PRN.

## 2023-01-27 NOTE — PROGRESS NOTES
[facility] Assessment completed, labs reviewed- Hcg 859 called to lakshmi Bell to proceed. Methorexate IM injections x2 completed. Patient tolerated well. Any issues or concerns during appointment: No.  Patient is aware of follow up appts with OBGYN. Patient instructed to call provider with temperature of 100.4 or greater or nausea/vomiting/ diarrhea or pain not controlled by medications  Discharged ambulatory.

## 2023-01-31 ENCOUNTER — NURSE ONLY (OUTPATIENT)
Dept: OBGYN CLINIC | Age: 43
End: 2023-01-31

## 2023-01-31 DIAGNOSIS — O00.90 ECTOPIC PREGNANCY WITHOUT INTRAUTERINE PREGNANCY, UNSPECIFIED LOCATION: Primary | ICD-10-CM

## 2023-01-31 LAB — HCG SERPL-ACNC: 648 MIU/ML (ref 0–6)

## 2023-02-01 ENCOUNTER — TELEPHONE (OUTPATIENT)
Dept: OBGYN CLINIC | Age: 43
End: 2023-02-01

## 2023-02-01 NOTE — TELEPHONE ENCOUNTER
----- Message from [facility] sent at 2/1/2023  9:14 AM EST -----    ----- Message -----  From: He Wu MD  Sent: 2/1/2023   8:44 AM EST  To: Ezekiel Wilder    Labs are normal post Methotrexate continue with plan

## 2023-02-03 ENCOUNTER — NURSE ONLY (OUTPATIENT)
Dept: OBGYN CLINIC | Age: 43
End: 2023-02-03

## 2023-02-03 DIAGNOSIS — O00.90 ECTOPIC PREGNANCY WITHOUT INTRAUTERINE PREGNANCY, UNSPECIFIED LOCATION: Primary | ICD-10-CM

## 2023-02-03 LAB
ALT SERPL-CCNC: 26 U/L (ref 12–65)
AST SERPL-CCNC: 21 U/L (ref 15–37)
BASOPHILS # BLD: 0.1 K/UL (ref 0–0.2)
BASOPHILS NFR BLD: 1 % (ref 0–2)
DIFFERENTIAL METHOD BLD: ABNORMAL
EOSINOPHIL # BLD: 0.3 K/UL (ref 0–0.8)
EOSINOPHIL NFR BLD: 6 % (ref 0.5–7.8)
ERYTHROCYTE [DISTWIDTH] IN BLOOD BY AUTOMATED COUNT: 12.4 % (ref 11.9–14.6)
HCG SERPL-ACNC: 163 MIU/ML (ref 0–6)
HCT VFR BLD AUTO: 38.2 % (ref 35.8–46.3)
HGB BLD-MCNC: 12 G/DL (ref 11.7–15.4)
IMM GRANULOCYTES # BLD AUTO: 0 K/UL (ref 0–0.5)
IMM GRANULOCYTES NFR BLD AUTO: 0 % (ref 0–5)
LYMPHOCYTES # BLD: 2.3 K/UL (ref 0.5–4.6)
LYMPHOCYTES NFR BLD: 42 % (ref 13–44)
MCH RBC QN AUTO: 27.6 PG (ref 26.1–32.9)
MCHC RBC AUTO-ENTMCNC: 31.4 G/DL (ref 31.4–35)
MCV RBC AUTO: 87.8 FL (ref 82–102)
MONOCYTES # BLD: 0.5 K/UL (ref 0.1–1.3)
MONOCYTES NFR BLD: 9 % (ref 4–12)
NEUTS SEG # BLD: 2.3 K/UL (ref 1.7–8.2)
NEUTS SEG NFR BLD: 42 % (ref 43–78)
NRBC # BLD: 0 K/UL (ref 0–0.2)
PLATELET # BLD AUTO: 224 K/UL (ref 150–450)
PMV BLD AUTO: 11.3 FL (ref 9.4–12.3)
RBC # BLD AUTO: 4.35 M/UL (ref 4.05–5.2)
WBC # BLD AUTO: 5.5 K/UL (ref 4.3–11.1)

## 2023-02-06 ENCOUNTER — TELEPHONE (OUTPATIENT)
Dept: OBGYN CLINIC | Age: 43
End: 2023-02-06

## 2023-02-06 NOTE — TELEPHONE ENCOUNTER
----- Message from Reji Garza MD sent at 2/6/2023  8:57 AM EST -----  Labs are normal continue with plan

## 2023-02-10 ENCOUNTER — NURSE ONLY (OUTPATIENT)
Dept: OBGYN CLINIC | Age: 43
End: 2023-02-10

## 2023-02-10 DIAGNOSIS — O00.90 ECTOPIC PREGNANCY WITHOUT INTRAUTERINE PREGNANCY, UNSPECIFIED LOCATION: Primary | ICD-10-CM

## 2023-02-10 LAB
BASOPHILS # BLD: 0.1 K/UL (ref 0–0.2)
BASOPHILS NFR BLD: 1 % (ref 0–2)
DIFFERENTIAL METHOD BLD: ABNORMAL
EOSINOPHIL # BLD: 0.3 K/UL (ref 0–0.8)
EOSINOPHIL NFR BLD: 4 % (ref 0.5–7.8)
ERYTHROCYTE [DISTWIDTH] IN BLOOD BY AUTOMATED COUNT: 12.8 % (ref 11.9–14.6)
HCG SERPL-ACNC: 13 MIU/ML (ref 0–6)
HCT VFR BLD AUTO: 36.5 % (ref 35.8–46.3)
HGB BLD-MCNC: 11.5 G/DL (ref 11.7–15.4)
IMM GRANULOCYTES # BLD AUTO: 0 K/UL (ref 0–0.5)
IMM GRANULOCYTES NFR BLD AUTO: 0 % (ref 0–5)
LYMPHOCYTES # BLD: 3.1 K/UL (ref 0.5–4.6)
LYMPHOCYTES NFR BLD: 39 % (ref 13–44)
MCH RBC QN AUTO: 27.6 PG (ref 26.1–32.9)
MCHC RBC AUTO-ENTMCNC: 31.5 G/DL (ref 31.4–35)
MCV RBC AUTO: 87.5 FL (ref 82–102)
MONOCYTES # BLD: 0.7 K/UL (ref 0.1–1.3)
MONOCYTES NFR BLD: 9 % (ref 4–12)
NEUTS SEG # BLD: 3.8 K/UL (ref 1.7–8.2)
NEUTS SEG NFR BLD: 47 % (ref 43–78)
NRBC # BLD: 0 K/UL (ref 0–0.2)
PLATELET # BLD AUTO: 244 K/UL (ref 150–450)
PMV BLD AUTO: 11.2 FL (ref 9.4–12.3)
RBC # BLD AUTO: 4.17 M/UL (ref 4.05–5.2)
WBC # BLD AUTO: 8 K/UL (ref 4.3–11.1)

## 2023-02-23 ENCOUNTER — NURSE ONLY (OUTPATIENT)
Dept: OBGYN CLINIC | Age: 43
End: 2023-02-23

## 2023-02-23 DIAGNOSIS — O00.90 ECTOPIC PREGNANCY WITHOUT INTRAUTERINE PREGNANCY, UNSPECIFIED LOCATION: Primary | ICD-10-CM

## 2023-02-23 DIAGNOSIS — O00.90 ECTOPIC PREGNANCY WITHOUT INTRAUTERINE PREGNANCY, UNSPECIFIED LOCATION: ICD-10-CM

## 2023-02-24 LAB — HCG SERPL-ACNC: 1 MIU/ML (ref 0–6)

## 2023-07-12 ENCOUNTER — OFFICE VISIT (OUTPATIENT)
Dept: OBGYN CLINIC | Age: 43
End: 2023-07-12
Payer: COMMERCIAL

## 2023-07-12 VITALS
WEIGHT: 121.4 LBS | SYSTOLIC BLOOD PRESSURE: 108 MMHG | BODY MASS INDEX: 22.34 KG/M2 | HEIGHT: 62 IN | DIASTOLIC BLOOD PRESSURE: 60 MMHG

## 2023-07-12 DIAGNOSIS — Z13.89 SCREENING FOR GENITOURINARY CONDITION: ICD-10-CM

## 2023-07-12 DIAGNOSIS — Z11.51 SPECIAL SCREENING EXAMINATION FOR HUMAN PAPILLOMAVIRUS (HPV): ICD-10-CM

## 2023-07-12 DIAGNOSIS — Z01.419 WELL WOMAN EXAM: Primary | ICD-10-CM

## 2023-07-12 LAB
BILIRUBIN, URINE, POC: NEGATIVE
BLOOD URINE, POC: NORMAL
GLUCOSE URINE, POC: NEGATIVE
KETONES, URINE, POC: NORMAL
LEUKOCYTE ESTERASE, URINE, POC: NEGATIVE
NITRITE, URINE, POC: NEGATIVE
PH, URINE, POC: 6.5 (ref 4.6–8)
PROTEIN,URINE, POC: NEGATIVE
SPECIFIC GRAVITY, URINE, POC: 1 (ref 1–1.03)
URINALYSIS CLARITY, POC: CLEAR
URINALYSIS COLOR, POC: YELLOW
UROBILINOGEN, POC: NORMAL

## 2023-07-12 PROCEDURE — 81002 URINALYSIS NONAUTO W/O SCOPE: CPT | Performed by: OBSTETRICS & GYNECOLOGY

## 2023-07-12 PROCEDURE — 99396 PREV VISIT EST AGE 40-64: CPT | Performed by: OBSTETRICS & GYNECOLOGY

## 2023-07-12 ASSESSMENT — PATIENT HEALTH QUESTIONNAIRE - PHQ9
1. LITTLE INTEREST OR PLEASURE IN DOING THINGS: 0
SUM OF ALL RESPONSES TO PHQ QUESTIONS 1-9: 0
SUM OF ALL RESPONSES TO PHQ9 QUESTIONS 1 & 2: 0
SUM OF ALL RESPONSES TO PHQ QUESTIONS 1-9: 0
SUM OF ALL RESPONSES TO PHQ QUESTIONS 1-9: 0
2. FEELING DOWN, DEPRESSED OR HOPELESS: 0
SUM OF ALL RESPONSES TO PHQ QUESTIONS 1-9: 0

## 2023-07-12 NOTE — PROGRESS NOTES
HPI:  Ms. Brooklynn Wu is a 40 y.o. female  who is here today for a well woman exam. She complains of none. She had an ectopic pregnancy in the spring. She does not want any other birth control  / referral for sterilization. Date Performed Result   PAP 21 Wnl hpv neg   Mammogram 21 Birads 2   Colonoscopy never    Dexa never        OB History          3    Para   1    Term   1            AB   2    Living             SAB   1    IAB        Ectopic   1    Molar        Multiple        Live Births                Daughter is 8   GYN History     Patient's last menstrual period was 2023. Cycle Length 25-28 Lasting 5  negative dysmenorrhea; negative postcoital bleeding        Past Medical History:   Diagnosis Date    Ectopic pregnancy     Hx of seasonal allergies     Ovarian cyst      Past Surgical History:   Procedure Laterality Date    WISDOM TOOTH EXTRACTION         Allergies   Allergen Reactions    Sulfa Antibiotics Other (See Comments) and Hives       Current Outpatient Medications   Medication Sig Dispense Refill    Multiple Vitamin (MULTIVITAMIN PO) Take by mouth      UNABLE TO FIND Progesterone cream daily. 30 mg/mL 2 clicks (Patient not taking: Reported on 2023)      UNABLE TO FIND Feroglobin iron supplement (Patient not taking: Reported on 2023)       No current facility-administered medications for this visit.          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

## 2023-07-20 LAB
CYTOLOGIST CVX/VAG CYTO: NORMAL
CYTOLOGY CVX/VAG DOC THIN PREP: NORMAL
HPV APTIMA: NEGATIVE
Lab: NORMAL
PATH REPORT.FINAL DX SPEC: NORMAL
STAT OF ADQ CVX/VAG CYTO-IMP: NORMAL

## 2024-01-31 ENCOUNTER — OFFICE VISIT (OUTPATIENT)
Dept: OBGYN CLINIC | Age: 44
End: 2024-01-31
Payer: COMMERCIAL

## 2024-01-31 VITALS — WEIGHT: 116.8 LBS | BODY MASS INDEX: 21.49 KG/M2 | HEIGHT: 62 IN

## 2024-01-31 DIAGNOSIS — Z87.59 HISTORY OF ECTOPIC PREGNANCY: Primary | ICD-10-CM

## 2024-01-31 DIAGNOSIS — M25.552 ARTHRALGIA OF LEFT SIDE OF PELVIS: ICD-10-CM

## 2024-01-31 LAB
HCG, PREGNANCY, URINE, POC: NEGATIVE
VALID INTERNAL CONTROL, POC: YES

## 2024-01-31 PROCEDURE — 99214 OFFICE O/P EST MOD 30 MIN: CPT | Performed by: OBSTETRICS & GYNECOLOGY

## 2024-01-31 PROCEDURE — 81025 URINE PREGNANCY TEST: CPT | Performed by: OBSTETRICS & GYNECOLOGY

## 2024-01-31 RX ORDER — KETOROLAC TROMETHAMINE 10 MG/1
10 TABLET, FILM COATED ORAL EVERY 6 HOURS PRN
Qty: 20 TABLET | Refills: 0 | Status: SHIPPED | OUTPATIENT
Start: 2024-01-31 | End: 2025-01-30

## 2024-01-31 ASSESSMENT — PATIENT HEALTH QUESTIONNAIRE - PHQ9
SUM OF ALL RESPONSES TO PHQ9 QUESTIONS 1 & 2: 0
SUM OF ALL RESPONSES TO PHQ QUESTIONS 1-9: 0
2. FEELING DOWN, DEPRESSED OR HOPELESS: 0
SUM OF ALL RESPONSES TO PHQ QUESTIONS 1-9: 0
1. LITTLE INTEREST OR PLEASURE IN DOING THINGS: 0
SUM OF ALL RESPONSES TO PHQ QUESTIONS 1-9: 0
SUM OF ALL RESPONSES TO PHQ QUESTIONS 1-9: 0

## 2025-07-21 NOTE — PROGRESS NOTES
Patient of Dr Barron presents with pain on left side since Saturday.  She states that it feels like it when she had an ectopic, she had an ectopic on her left side last year. She did a pregnancy test at home and it was negative. She is taking Nsaids, which are helping.  LMP 01/22/24 she does not use anything for birth control. She does think that her tube is blocked on her left side, she had a procedure (HSG) done about 5 years or so ago that indicated that her left tube may be blocked.     Negative pregnancy test in office although timing of normal menses made pregnancy unlikely.      Patient seems stoic but not in distress.  Records reviewed from ED and patient counseled face to face for 30 minutes regarding her complaints, differential diagnosis and disposition with greater than 50% of the time spent in this way.    Likely ovarian cyst or ruptured cyst.  Unlikely that scarring or issues related to tubal ectopic are relevant at this point without any complaints in the past year.  Pelvic USG recommended with followup in the next few days.  Rx for Toradol offered and accepted.         General Sunscreen Counseling: I recommended a broad spectrum sunscreen with a SPF of 30 or higher.  I explained that SPF 30 sunscreens block approximately 97 percent of the sun's harmful rays.  Sunscreens should be applied at least 15 minutes prior to expected sun exposure and then every 2 hours after that as long as sun exposure continues. If swimming or exercising sunscreen should be reapplied every 45 minutes to an hour after getting wet or sweating.  One ounce, or the equivalent of a shot glass full of sunscreen, is adequate to protect the skin not covered by a bathing suit. I also recommended a lip balm with a sunscreen as well. Sun protective clothing can be used in lieu of sunscreen but must be worn the entire time you are exposed to the sun's rays. Detail Level: Detailed